# Patient Record
Sex: FEMALE | Race: WHITE
[De-identification: names, ages, dates, MRNs, and addresses within clinical notes are randomized per-mention and may not be internally consistent; named-entity substitution may affect disease eponyms.]

---

## 2018-12-14 ENCOUNTER — HOSPITAL ENCOUNTER (INPATIENT)
Dept: HOSPITAL 74 - JDEL | Age: 23
LOS: 3 days | Discharge: HOME | DRG: 560 | End: 2018-12-17
Attending: OBSTETRICS & GYNECOLOGY | Admitting: OBSTETRICS & GYNECOLOGY
Payer: COMMERCIAL

## 2018-12-14 VITALS — BODY MASS INDEX: 30.2 KG/M2

## 2018-12-14 DIAGNOSIS — Z3A.39: ICD-10-CM

## 2018-12-14 LAB
ANION GAP SERPL CALC-SCNC: 9 MMOL/L (ref 8–16)
APTT BLD: 27.2 SECONDS (ref 25.2–36.5)
BASOPHILS # BLD: 0.4 % (ref 0–2)
BUN SERPL-MCNC: 8 MG/DL (ref 7–18)
CALCIUM SERPL-MCNC: 8.6 MG/DL (ref 8.5–10.1)
CHLORIDE SERPL-SCNC: 105 MMOL/L (ref 98–107)
CO2 SERPL-SCNC: 21 MMOL/L (ref 21–32)
CREAT SERPL-MCNC: 0.5 MG/DL (ref 0.55–1.3)
DEPRECATED RDW RBC AUTO: 14 % (ref 11.6–15.6)
EOSINOPHIL # BLD: 0.6 % (ref 0–4.5)
GLUCOSE SERPL-MCNC: 115 MG/DL (ref 74–106)
HCT VFR BLD CALC: 35.4 % (ref 32.4–45.2)
HGB BLD-MCNC: 12.4 GM/DL (ref 10.7–15.3)
INR BLD: 0.92 (ref 0.83–1.09)
LYMPHOCYTES # BLD: 22.1 % (ref 8–40)
MCH RBC QN AUTO: 30.2 PG (ref 25.7–33.7)
MCHC RBC AUTO-ENTMCNC: 35.1 G/DL (ref 32–36)
MCV RBC: 86 FL (ref 80–96)
MONOCYTES # BLD AUTO: 7.8 % (ref 3.8–10.2)
NEUTROPHILS # BLD: 69.1 % (ref 42.8–82.8)
PLATELET # BLD AUTO: 217 K/MM3 (ref 134–434)
PMV BLD: 9.5 FL (ref 7.5–11.1)
POTASSIUM SERPLBLD-SCNC: 3.8 MMOL/L (ref 3.5–5.1)
PT PNL PPP: 10.9 SEC (ref 9.7–13)
RBC # BLD AUTO: 4.11 M/MM3 (ref 3.6–5.2)
SODIUM SERPL-SCNC: 136 MMOL/L (ref 136–145)
WBC # BLD AUTO: 9.5 K/MM3 (ref 4–10)

## 2018-12-14 NOTE — HP
Past Medical History





- Primary Care Physician


PCP:: Jennifer Man





- Admission


Chief Complaint: 23 yrs , EDC 18 , 39.4/7 weeks gestation, srom 

since 9.10 PM , no pain yet .


History of Present Illness: 


pt has prenatal care at [facility] 


she was evaluated on 18 in L&D for decrease FM . 


18 sono 36.4 weeks , bpp4/8, capo 13.2 


 she was observed in L&D for continuous monitoring , FHR  throughout stay was 

cat-1 


Repeat sono &  BPP with MFM reported as 38.2 weeks, Vx, Ant Placenta, Bpp8/8, 

EFW 6'15" , CAPO 12.8 cm  


pt was instructed to keep watch on FM , she returned for NST on  & . 


she did not go back  to follow with her prenatal clinic 


Prenatal chart was obtained 


18 O Pos, Rubella immune, Hbsag neg, h/h10.9/31.0, hiv neg 


18 1 hr gtt 123 , h/h11/32 , AFP neg 


FINAL EDC assigned to her was 18 


Sonogram done for fetal growth : Sono --21.1  wks    10/3/18-- 29.6 wks, 

   18--33.3 wks 


h/o Tdap & Flu vaccine taken 


18 labs hIV neg 








History Source: Patient, Medical Record


Limitations to Obtaining History: No Limitations





- Past Medical History


CNS: No: Migraine, Seizure


Cardiovascular: No: HTN, Murmur


Pulmonary: No: Asthma


Hepatobiliary: Yes: Other (none known)


Renal/: No: UTI


...: 3


...Para: 2 (2NSVD  in [place] baby girl,  in [place]-baby girl )


...Term: 2


...LMP: 18


... Weeks Gestation by Dates: 37.4


...EDC by Dates: 19 (Mistaken dates )


...EDC by Sono: 18 (39.4 weeks by SONO )


Heme/Onc: Yes: Anemia (rx po prenatal vit & iron), Other (denies)


Infectious Disease: Yes: Other (declines std)


Psych: No: Addictions, Anxiety, Bipolar, Depression, Panic, Psychosis, 

Schizophrenia, Other


Endocrine: No: Diabetes Insipidus, Diabetes Mellitus, Hyperthyroidism, 

Hypothyroidism





- Past Surgical History


Past Surgical History: Yes: None


Hx Myomectomy: No


Hx Transabdominal Cerclage: No





- Smoking History


Smoking history: Never smoked


Have you smoked in the past 12 months: No





- Alcohol/Substance Use


Hx Alcohol Use: No


History of Substance Use: reports: None





Home Medications





- Allergies


Allergies/Adverse Reactions: 


 Allergies











Allergy/AdvReac Type Severity Reaction Status Date / Time


 


No Known Allergies Allergy   Verified 18 15:52














- Home Medications


Home Medications: 


Ambulatory Orders





Prenat 115/Iron Fum/Folic/Dss [Prenatal 19 Tablet] 1 tab PO DAILY 18 











Physical Exam - Maternity


Constitutional: Yes: Well Nourished, No Distress, Anxious


Eyes: Yes: WNL


HENT: Yes: WNL, Normocephalic


Neck: Yes: WNL, Supple


Cardiovascular: Yes: WNL, Regular Rate and Rhythm


Lungs: Clear to auscultation


Breast(s): Yes: WNL.  No: Mass





- Abdominal Exam/OB


Fundal Height: 38


Number of Fetuses: Single


Fetal Presentation: Vertex


Contractions: No


Fetal Monitor Mode: External


Fetal Heart Rate (range): 145


Fetal Heart Rate Location: [facility]


Category: I


Accelerations: Uniform





- Vaginal Exam/OB


Vaginal Bleediing: No


Speculum Exam: No


Dilatation (cm): 1-2


Effacement (%): 60


Amniotic Membrane Status: Ruptured


Nitrazine Test: Positive


Amniotic Fluid: Yes: Clear


Fetal Presentation: Vertex/Position (exam time at 9.50 PM)


Fetal Station: -3





- Physical Exam


Musculoskeletal: Yes: WNL


Extremities: Yes: WNL.  No: Calf Tenderness


Edema: Yes


Edema: LLE: 1+, RLE: 1+


Integumentary: Yes: Tattoos


Deep Tendon Reflex Grade: Normal +2


...Motor Strength: WNL


Psychiatric: Yes: WNL, Alert, Oriented





- Labs


Lab Results: 


 CBC, BMP





 18 22:00 





Microbiology





18 22:45   Vaginal   Group B Streptococcus Screen (DEE) - Final


                              NO BETA HEMOLYTIC STREPTOCOCCI ISOLATED





 Laboratory Tests











  18





  21:45 22:45


 


C. trachomatis (KEV)   Negative


 


HIV 1&2 Antibody Screen  Negative 


 


HIV P24 Antigen  Negative 


 


N. gonorrhoeae (KEV)   Negative








 Laboratory Tests











  18





  22:00


 


PT with INR  10.90


 


INR  0.92


 


PTT (Actin FS)  27.2








 Laboratory Tests











  18





  22:00


 


Sodium  136


 


Potassium  3.8


 


Chloride  105


 


Carbon Dioxide  21


 


BUN  8


 


Creatinine  0.5 L


 


Random Glucose  115 H


 


Calcium  8.6














Problem List





- Problems


(1) Pregnancy with 39 completed weeks gestation


Code(s): Z3A.39 - 39 WEEKS GESTATION OF PREGNANCY   





(2) SROM (spontaneous rupture of membranes)


Code(s): XBA9926 -    





Assessment/Plan


23 yrs  , 39.4/7 weeks, srom , not in labor, GBS neg , 


Prenatal care elsewhere 


Plan may ambulate 


       pitocin augmentation prn 


       trial vaginal delivery 


       stadol + phenrgan & or epidural for labor analgesia

## 2018-12-15 RX ADMIN — FERROUS SULFATE TAB EC 324 MG (65 MG FE EQUIVALENT) SCH MG: 324 (65 FE) TABLET DELAYED RESPONSE at 08:15

## 2018-12-15 RX ADMIN — IBUPROFEN PRN MG: 600 TABLET, FILM COATED ORAL at 16:50

## 2018-12-15 RX ADMIN — Medication SCH TAB: at 09:14

## 2018-12-15 RX ADMIN — ACETAMINOPHEN PRN MG: 325 TABLET ORAL at 16:50

## 2018-12-15 RX ADMIN — IBUPROFEN PRN MG: 600 TABLET, FILM COATED ORAL at 08:54

## 2018-12-15 RX ADMIN — ACETAMINOPHEN PRN MG: 325 TABLET ORAL at 08:53

## 2018-12-15 RX ADMIN — FERROUS SULFATE TAB EC 324 MG (65 MG FE EQUIVALENT) SCH MG: 324 (65 FE) TABLET DELAYED RESPONSE at 16:51

## 2018-12-15 NOTE — PN
Progress Note, Labor


  ** Vaginal Exam #1


Labor Exam Date: 12/15/18


Labor Exam Time: 02:20


Fetal Heart Rate (range): 150-160


Dilatation: 4


Effacement (%): 90


Amniotic Membrane Status: Ruptured


Fetal Presentation: Vertex/Position


Fetal Station: -1


Remarks: 


fhr cat-1 


uc 2-4 min irregular


1.25 AM Stadol 1mg + Phenrgan 25 mg iv stat was given 


pt requests for Epidural 


 








  ** Vaginal Exam #2


Labor Exam Date: 12/15/18


Labor Exam Time: 05:15


Fetal Heart Rate (range): 150


Dilatation: 10


Effacement (%): 100


Amniotic Membrane Status: Ruptured


Fetal Presentation: Vertex/Position


Fetal Station: +2


Remarks: 


fhr cat-1 


UC 2-4 min 


pt wants to push 


3.15AM epidural labor analgesia was given 


 Selected Entries











  12/15/18





  05:00


 


Pulse Rate 130 H


 


Respiratory 17





Rate 


 


Blood Pressure 104/67


 


O2 Sat by Pulse 100





Oximetry (%) 








 Laboratory Tests











  12/14/18





  22:00


 


Sodium  136


 


Potassium  3.8


 


Chloride  105


 


Carbon Dioxide  21


 


Anion Gap  9


 


BUN  8


 


Creatinine  0.5 L


 


Random Glucose  115 H


 


Calcium  8.6

## 2018-12-15 NOTE — PN
Delivery





- Delivery


Vaginal Delivery: No Problems, Spontaneous (baby delivered vx presentation, BOLA 

position , immediate oral & nasal suction was done at perineum , shoulder & 

body deievered without problem . placenta & membranes delievered . perineum & 

vagina intact)


Type of Anesthesia: Epidural


EBL (cc): 150





Delivery, Single Birth





- Stages of Labor


Date 1st Stage Initiatied: 12/15/18


Time 1st Stage Initiated: 00:00


Date 2nd Stage Initiated: 12/15/18


Time 2nd Stage Initiated: 05:15


Date of Delivery: 12/15/18


Time of Delivery: 05:46


Date Placenta Delivered: 12/15/18


Time Placenta Delivered: 05:52


Placenta: Yes: Spontaneous, Uterine Exploration





- Condition of Infant


Pediatrician/Neonatologist Present: No


Infant Gender: Female


Birth Weight: 6 lb 15 oz


Position: Left, OA


Total Hours ROM (Hrs/Mins): 7hrs 42 min 





- Apgar


  ** 1 Minute


Apgar Total Score: 9





  ** 5 Minutes


Apgar Total Score: 9





- [place] Feeding Plan


Initial Plan: Exclusive breastfeeding throughout hospitalization





Remarks





- Remarks


Remarks: 


23 yrs , 39.4/7 weeks, admitted with SROM , went into spontaneous labor 


gbs neg 


prenatal care at [facility]

## 2018-12-16 LAB
BASOPHILS # BLD: 0.4 % (ref 0–2)
DEPRECATED RDW RBC AUTO: 14.2 % (ref 11.6–15.6)
EOSINOPHIL # BLD: 1.7 % (ref 0–4.5)
HCT VFR BLD CALC: 35 % (ref 32.4–45.2)
HGB BLD-MCNC: 12.1 GM/DL (ref 10.7–15.3)
LYMPHOCYTES # BLD: 27.9 % (ref 8–40)
MCH RBC QN AUTO: 30.5 PG (ref 25.7–33.7)
MCHC RBC AUTO-ENTMCNC: 34.7 G/DL (ref 32–36)
MCV RBC: 87.9 FL (ref 80–96)
MONOCYTES # BLD AUTO: 7.2 % (ref 3.8–10.2)
NEUTROPHILS # BLD: 62.8 % (ref 42.8–82.8)
PLATELET # BLD AUTO: 178 K/MM3 (ref 134–434)
PMV BLD: 9.6 FL (ref 7.5–11.1)
RBC # BLD AUTO: 3.98 M/MM3 (ref 3.6–5.2)
WBC # BLD AUTO: 10 K/MM3 (ref 4–10)

## 2018-12-16 RX ADMIN — FERROUS SULFATE TAB EC 324 MG (65 MG FE EQUIVALENT) SCH MG: 324 (65 FE) TABLET DELAYED RESPONSE at 08:25

## 2018-12-16 RX ADMIN — IBUPROFEN PRN MG: 600 TABLET, FILM COATED ORAL at 21:19

## 2018-12-16 RX ADMIN — Medication SCH TAB: at 09:33

## 2018-12-16 RX ADMIN — FERROUS SULFATE TAB EC 324 MG (65 MG FE EQUIVALENT) SCH MG: 324 (65 FE) TABLET DELAYED RESPONSE at 16:51

## 2018-12-16 RX ADMIN — IBUPROFEN PRN MG: 600 TABLET, FILM COATED ORAL at 08:28

## 2018-12-16 RX ADMIN — IBUPROFEN PRN MG: 600 TABLET, FILM COATED ORAL at 16:50

## 2018-12-16 RX ADMIN — IBUPROFEN PRN MG: 600 TABLET, FILM COATED ORAL at 03:46

## 2018-12-16 RX ADMIN — ACETAMINOPHEN PRN MG: 325 TABLET ORAL at 21:18

## 2018-12-16 RX ADMIN — ACETAMINOPHEN PRN MG: 325 TABLET ORAL at 16:50

## 2018-12-16 RX ADMIN — ACETAMINOPHEN PRN MG: 325 TABLET ORAL at 08:28

## 2018-12-16 NOTE — PN
Post Partum Progress Note





- Subjective


Subjective: 


no comlains 





Post Partum Day: 1


Type of Delivery: 


Vital Signs: 


 Vital Signs











Temperature  98.3 F   18 06:00


 


Pulse Rate  82   18 06:00


 


Respiratory Rate  20   18 06:00


 


Blood Pressure  107/63   18 06:00


 


O2 Sat by Pulse Oximetry (%)  100   12/15/18 05:30











Breast Exam: Yes: Soft, Other (not breast feeding ).  No: Engorged


Uterus: Yes: Fundus Firm, Fundus below umbilicus, Non-tender


Lochia: Yes: Rubra


Lochia, amount: Moderate


Extremities: Yes: Calves non-tender


Perineum: Yes: Episiotomy


Activity: Ambulating





- Labs


Labs: 


 CBC











WBC  10.0 K/mm3 (4.0-10.0)   18  07:00    


 


RBC  3.98 M/mm3 (3.60-5.2)   18  07:00    


 


Hgb  12.1 GM/dL (10.7-15.3)   18  07:00    


 


Hct  35.0 % (32.4-45.2)   18  07:00    


 


MCV  87.9 fl (80-96)   18  07:00    


 


MCH  30.5 pg (25.7-33.7)   18  07:00    


 


MCHC  34.7 g/dl (32.0-36.0)   18  07:00    


 


RDW  14.2 % (11.6-15.6)   18  07:00    


 


Plt Count  178 K/MM3 (134-434)   18  07:00    


 


MPV  9.6 fl (7.5-11.1)   18  07:00    


 


Absolute Neuts (auto)  6.3 K/mm3 (1.5-8.0)   18  07:00    


 


Neutrophils %  62.8 % (42.8-82.8)   18  07:00    


 


Lymphocytes %  27.9 % (8-40)  D 18  07:00    


 


Monocytes %  7.2 % (3.8-10.2)   18  07:00    


 


Eosinophils %  1.7 % (0-4.5)  D 18  07:00    


 


Basophils %  0.4 % (0-2.0)   18  07:00    


 


Nucleated RBC %  0 % (0-0)   18  07:00    














Problem List





- Problems


(1) Pregnancy with 39 completed weeks gestation


Code(s): Z3A.39 - 39 WEEKS GESTATION OF PREGNANCY   





(2) SROM (spontaneous rupture of membranes)


Code(s): HRL8182 -    





(3) Normal spontaneous vaginal delivery


Code(s): O80 - ENCOUNTER FOR FULL-TERM UNCOMPLICATED DELIVERY   





(4) Encounter for postpartum care after hospital delivery


Code(s): Z39.2 - ENCOUNTER FOR ROUTINE POSTPARTUM FOLLOW-UP   





Assessment/Plan


stable. 


plan discharge today

## 2018-12-17 VITALS — TEMPERATURE: 97.8 F | SYSTOLIC BLOOD PRESSURE: 104 MMHG | DIASTOLIC BLOOD PRESSURE: 50 MMHG | HEART RATE: 65 BPM

## 2018-12-17 RX ADMIN — FERROUS SULFATE TAB EC 324 MG (65 MG FE EQUIVALENT) SCH MG: 324 (65 FE) TABLET DELAYED RESPONSE at 07:48

## 2018-12-17 RX ADMIN — IBUPROFEN PRN MG: 600 TABLET, FILM COATED ORAL at 07:48

## 2018-12-17 RX ADMIN — ACETAMINOPHEN PRN MG: 325 TABLET ORAL at 07:47

## 2018-12-17 RX ADMIN — Medication SCH TAB: at 10:14

## 2018-12-17 NOTE — DS
Physical Exam-GYN


Vital Signs: 


 Vital Signs











Temperature  98.0 F   18 22:00


 


Pulse Rate  69   18 22:00


 


Respiratory Rate  20   18 22:00


 


Blood Pressure  107/61   18 22:00


 


O2 Sat by Pulse Oximetry (%)  100   12/15/18 05:30











Constitutional: Yes: Well Nourished


Eyes: Yes: WNL


HENT: Yes: WNL, Normocephalic


Neck: Yes: WNL


Cardiovascular: Yes: WNL, Regular Rate and Rhythm


Respiratory: Yes: WNL, Regular, CTA Bilaterally


Gastrointestinal: Yes: WNL, Normal Bowel Sounds


Renal/: Yes: WNL


....Post Partum: Yes: Uterus firm, Moderate lochia rubra (perineum intact)


Breast(s): Yes: WNL (bottle feeding)


Musculoskeletal: Yes: WNL


Extremities: Yes: WNL.  No: Calf Tenderness


Edema: LLE: Trace, RLE: Trace


Integumentary: Yes: Tattoos


...Motor Strength: WNL


Psychiatric: Yes: WNL, Alert, Oriented


Labs: 


 CBC, BMP





 18 07:00 





 18 22:00 











Delivery





- Delivery


Vaginal Delivery: No Problems, Spontaneous (baby delivered vx presentation, BOLA 

position , immediate oral & nasal suction was done at perineum , shoulder & 

body deievered without problem . placenta & membranes delievered . perineum & 

vagina intact)


Type of Anesthesia: Epidural


EBL (cc): 150





Delivery, Single Birth





- Stages of Labor


Date 1st Stage Initiatied: 12/15/18


Time 1st Stage Initiated: 00:00


Date 2nd Stage Initiated: 12/15/18


Time 2nd Stage Initiated: 05:15


Date of Delivery: 12/15/18


Time of Delivery: 05:46


Time Placenta Delivered: 05:52


Placenta: Yes: Spontaneous, Uterine Exploration





- Condition of Infant


Pediatrician/Neonatologist Present: No


Infant Gender: Female


Birth Weight: 6 lb 15 oz


Position: Left, OA


Total Hours ROM (Hrs/Mins): 7hrs 42 min 





- Apgar


  ** 1 Minute


Apgar Total Score: 9





  ** 5 Minutes


Apgar Total Score: 9





-  Feeding Plan


Initial Plan: Exclusive breastfeeding throughout hospitalization





Remarks





- Remarks


Remarks: 


23 yrs , 39.4/7 weeks, admitted with OM , went into spontaneous labor 


gbs neg 


prenatal care at [facility] 


post partum course uneventful.


discharge today.  








Discharge Summary


Reason For Visit: LABOR


Current Active Problems





Encounter for postpartum care after hospital delivery (Acute)


Normal spontaneous vaginal delivery (Acute)


Pregnancy with 39 completed weeks gestation (Acute)


SROM (spontaneous rupture of membranes) (Acute)








Condition: Stable





- Instructions


Diet, Activity, Other Instructions: 


Post Partum Instructions





DIET:


Continue good diet high in protein, calcium, and iron rich foods. Drink at 

least eight (8) glasses of water daily in addition to other fluids.


___   Regular diet











MEDICATIONS:


Continue prenatal vitamins and iron as previously directed. Motrin and Tylenol 

may be taken for minor discomfort. 





ACTIVITY:


Mild to moderate exercise may be started in two (2) weeks. Take frequent rest 

periods. Resume normal activity after six (6) week check up. 





WOUND CARE OF OPERATIVE SITE:


Continue use of perineal bottle until vaginal discharge stops. Keep area clean. 

Shower daily. Keep abdominal wound dry. Report any drainage or redness to 

physician. Tub baths, tampons and douches are not permitted for 6 weeks.





ct  Breast feeding   & or  Bottle feeding





BREAST CARE: (For those that are not breastfeeding): If engorgement occurs:


Wear tight fitting bra.


Take Tylenol or Motrin for pain.


Apply cold packs (ice in bags to each breast )





FAMILY PLANNING:


There are many birth control alternatives to pursue and they should be 

discussed at your first office visit. You may resume sexual activity after your 

six (6) week check up. (Remember, breast feeding is not a contraceptive)





NEXT PHYSICIAN APPOINTMENT:


Be certain to call for a four - six (4-6) week appointment, unless otherwise 

directed.  





Call Clinic or got to Emergency Dept if you have any of the following:


   Heavy vaginal bleeding


   Painful urination


   Leg pain


   Unusual odor noted to vaginal bleeding


   High fever


   Red streaking noted on breast








Referrals: 


Jennifer Man MD [Staff Physician] - 


Disposition: HOME





- Home Medications


Comprehensive Discharge Medication List: 


Ambulatory Orders





Prenat 115/Iron Fum/Folic/Dss [Prenatal 19 Tablet] 1 tab PO DAILY 18 


Ferrous Sulfate [Feosol] 325 mg PO BIDWM #90 tab 12/15/18 


Ibuprofen [Motrin -] 200 mg PO Q4H PRN  tablet 12/15/18 


Prenatal Vitamins (Sjr) - 1 tab PO DAILY  tablet 12/15/18 


Witch Hazel 50% (Tucks) [Tucks Pads -] 1 pad TP PRN PRN  pad 12/15/18